# Patient Record
Sex: FEMALE | Race: WHITE | NOT HISPANIC OR LATINO | Employment: OTHER | URBAN - METROPOLITAN AREA
[De-identification: names, ages, dates, MRNs, and addresses within clinical notes are randomized per-mention and may not be internally consistent; named-entity substitution may affect disease eponyms.]

---

## 2017-03-06 ENCOUNTER — TELEPHONE (OUTPATIENT)
Dept: RHEUMATOLOGY | Facility: HOSPITAL | Age: 82
End: 2017-03-06

## 2017-03-06 DIAGNOSIS — M81.0 OSTEOPOROSIS, POSTMENOPAUSAL: Primary | ICD-10-CM

## 2017-03-21 DIAGNOSIS — M81.0 OSTEOPOROSIS, POSTMENOPAUSAL: Primary | ICD-10-CM

## 2017-03-21 RX ORDER — SODIUM CHLORIDE 0.9 % (FLUSH) 0.9 %
10 SYRINGE (ML) INJECTION
Status: CANCELLED | OUTPATIENT
Start: 2017-03-21

## 2017-03-21 RX ORDER — ZOLEDRONIC ACID 5 MG/100ML
5 INJECTION, SOLUTION INTRAVENOUS ONCE
Status: CANCELLED | OUTPATIENT
Start: 2017-03-21 | End: 2017-03-21

## 2017-05-29 ENCOUNTER — OFFICE VISIT (OUTPATIENT)
Dept: RHEUMATOLOGY | Facility: CLINIC | Age: 82
End: 2017-05-29
Payer: MEDICARE

## 2017-05-29 ENCOUNTER — LAB VISIT (OUTPATIENT)
Dept: LAB | Facility: HOSPITAL | Age: 82
End: 2017-05-29
Attending: INTERNAL MEDICINE
Payer: MEDICARE

## 2017-05-29 VITALS
DIASTOLIC BLOOD PRESSURE: 64 MMHG | HEART RATE: 62 BPM | BODY MASS INDEX: 24.7 KG/M2 | SYSTOLIC BLOOD PRESSURE: 175 MMHG | WEIGHT: 134.25 LBS | HEIGHT: 62 IN

## 2017-05-29 DIAGNOSIS — I10 ESSENTIAL HYPERTENSION: Chronic | ICD-10-CM

## 2017-05-29 DIAGNOSIS — Z51.81 MEDICATION MONITORING ENCOUNTER: Chronic | ICD-10-CM

## 2017-05-29 DIAGNOSIS — M81.0 OSTEOPOROSIS, POSTMENOPAUSAL: ICD-10-CM

## 2017-05-29 DIAGNOSIS — M81.0 OSTEOPOROSIS, POSTMENOPAUSAL: Primary | ICD-10-CM

## 2017-05-29 LAB
ANION GAP SERPL CALC-SCNC: 9 MMOL/L
BUN SERPL-MCNC: 20 MG/DL
CALCIUM SERPL-MCNC: 9.1 MG/DL
CHLORIDE SERPL-SCNC: 104 MMOL/L
CO2 SERPL-SCNC: 23 MMOL/L
CREAT SERPL-MCNC: 0.9 MG/DL
EST. GFR  (AFRICAN AMERICAN): >60 ML/MIN/1.73 M^2
EST. GFR  (NON AFRICAN AMERICAN): 59 ML/MIN/1.73 M^2
GLUCOSE SERPL-MCNC: 106 MG/DL
POTASSIUM SERPL-SCNC: 4.2 MMOL/L
SODIUM SERPL-SCNC: 136 MMOL/L

## 2017-05-29 PROCEDURE — 99213 OFFICE O/P EST LOW 20 MIN: CPT | Mod: S$PBB,,, | Performed by: INTERNAL MEDICINE

## 2017-05-29 PROCEDURE — 99999 PR PBB SHADOW E&M-EST. PATIENT-LVL III: CPT | Mod: PBBFAC,,, | Performed by: INTERNAL MEDICINE

## 2017-05-29 PROCEDURE — 99213 OFFICE O/P EST LOW 20 MIN: CPT | Mod: PBBFAC,25,PO | Performed by: INTERNAL MEDICINE

## 2017-05-29 RX ORDER — VALSARTAN 80 MG/1
TABLET ORAL
COMMUNITY
Start: 2017-04-24

## 2017-05-29 RX ORDER — ERGOCALCIFEROL 1.25 MG/1
CAPSULE ORAL
Qty: 3 CAPSULE | Refills: 4 | Status: SHIPPED | OUTPATIENT
Start: 2017-05-29 | End: 2018-07-22 | Stop reason: SDUPTHER

## 2017-05-29 NOTE — PROGRESS NOTES
RHEUMATOLOGY FOLLOWUP    CHIEF COMPLAINT:  Osteoporosis.    PERTINENT HISTORY:  Orlin is seen yearly for followup of osteoporosis.  She is on   Reclast and had dose in 2015 and 2016.  She has been on IV Boniva prior to that   and had been on holiday.  She has had no falls or Fx.    REVIEW OF SYSTEMS:  GENERAL:  Her weight is stable.  No falls.  No fevers, chills, fatigability,   change in appetite.  No recent infections.  SKIN:  No new rashes, itching or changes in color or texture of skin, no   Raynaud's, no malar rash.  HEAD:  No headache or recent head trauma.  EYES:  Visual acuity fine.  No ocular pain or redness.  EARS:  Denies ear pain, discharge or vertigo.  NOSE:  No loss of smell, no epistaxis or post nasal drip.  MOUTH AND THROAT:  No hoarseness or change in voice or oral ulcers.  No   difficulty swallowing or dryness.  NODES:  Denies swollen glands.  CHEST:  Denies shortness of breath, HASSAN, cyanosis, wheezing, cough and sputum   production.  CARDIOVASCULAR:  Hypertension is present and she is on Diovan and note a little   higher today, though jumps up and down, she says.  Denies chest pain, PND,   orthopnea or reduced exercise tolerance.  ABDOMEN:  No GI complaints.  No weight loss.  Denies nausea, vomiting, diarrhea,   abdominal pain, hematemesis or blood in stool.  URINARY:  No flank pain, dysuria or hematuria.  PERIPHERAL VASCULAR:  No claudication or cyanosis.  NEUROLOGIC:  No neurologic complaints.  No numbness, weakness or tingling.  METABOLIC:  Hyperlipidemia is appreciated.  I do not see that she is on any   other meds for that presently.  She sees Dr. Madrigal.    PHYSICAL EXAMINATION:  VITAL SIGNS:  With her height 5 feet 2 inches, weight 62 kg, blood pressure   175/64, pulse in the 60s.  Pain score 0.  APPEARANCE:  Well nourished, well developed, in no acute distress.  SKIN:  No rashes, no wounds, no ecchymosis.  Nail beds pink.  No digital ulcers.    No nodules or tightness noted.  HEENT:   Normocephalic, atraumatic, alert and oriented x3.  PERRLA.  Conjunctivae   clear, sclerae nonicteric.  No tonsillar enlargement.  No pharyngeal erythema   or exudate.  No ulcers or lesions noted.  Mucous membranes moist and pink.  Oral   pharynx clear.  Neck supple, no JVD.  Normal ROM.  Thyroid normal.  No masses   or tracheal deviation.  No cervical, axillary or inguinal lymph node   enlargement.  CHEST:  Lungs clear to auscultation bilaterally.  No dullness to percussion.  No   accessory muscle use.  No intercostal retraction noted.  CARDIOVASCULAR:  Normal S1, S2.  No rubs, murmurs or gallops.  No peripheral   edema.  ABDOMEN:  Bowel sounds normal, abdomen soft, not distended.  No tenderness or   masses.  No hepatosplenomegaly.  EXTREMITIES:  No clubbing, cyanosis or edema noted.  Dorsalis and pedis pulses   2+ palpable.  NEUROLOGICAL:  Cranial nerves II-XII intact.  Motor 5/5 strength major   flexors/extensors.  No tremor.  GAIT AND POSTURE:  Normal gait.  No cerebellar signs.  MUSCULOSKELETAL:  With very mild kyphosis.  No tenderness.  Has normal range of   motion in her small joints.  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  Hips and   knees move well.  They are not giving her trouble today.      LABORATORY DATA:  Shows calcium 9.1, creatinine clearance 59, creatinine 0.9.    Note she has been told in the past that she might have chronic kidney disease,   suspect this is an abnormal level because of dehydration of some medication she   was using.  Vitamin D level last was 39, holding steady over the last four or   five years.  Her DEXA reviewed today.  Bone density is improved at -2 at the   spine, about 6% and -1.8 at the total hip and that is holding steady over the   last several years.    IMPRESSION:  1.  Osteopenia with improvement since 2015.  2.  Balance is good.  3.  Hypertension - systolic.  4.  Chronic kidney disease by history though creatinine here looks stable over   the last three  years.    PLAN:  I am going to give her a Reclast holiday since things are stable to   better.  Maintain exercise and a fall prevention sheet was given today.    Maintain vitamin D 50,000 units once a month.  I gave her a prescription today.    Work with Dr. Madrigal on her hypertension.            /foster [id number] review        SL/PN  dd: 05/29/2017 14:05:14 (CDT)  td: 05/30/2017 08:27:54 (CDT)  Doc ID   #[id number]  Job ID #[id number]    CC:     Dr ty fox at [facility]

## 2017-05-29 NOTE — PATIENT INSTRUCTIONS
Stopping IV reclast for a while- much improved    Cont D once month    Preventing Falls  Each year about one-third of all persons over age 65 will fall. Many of these falls result in broken bones. Some common causes of falls include outdoor and indoor hazards. Certain lifestyle behaviors can also increase your chances of falling.  Outdoor Safety Tips  Try the following tips to help prevent falls when you are outside:  Wear low-heeled shoes with rubber soles for more solid footing (traction), and wear warm boots in winter.   Use hand rails as you go up and down steps and on escalators.   If sidewalks look slippery, walk in the grass for more solid footing.   In winter, carry a small bag of rock salt or kosher salt in your pocket or car. You can then sprinkle the salt or tyler litter on sidewalks or streets that are slippery.   Look carefully at floor surfaces in public buildings. Floors made of highly polished marble or tile can be very slippery. When these surfaces are wet, they may become dangerous. When floors have plastic or carpet runners in place, stay on them whenever possible.   Keep your porch, deck, walkways and driveway free of leaves, snow, trash or clutter. Also keep them in good repair. Cover porch steps with a gritty, weather-proof paint and install handrails on both sides.   Turn on the light outside your front door before leaving your home in the early evening so that you have outdoor light when you return after dark.   Use a shoulder bag, dar pack or a backpack purse to leave your hands free.   Use a walker or cane as needed.   Find out about community services that can provide help, such as 24-hour pharmacies and grocery stores that take orders by phone or internet and deliver, especially in poor weather.   Stop at curbs and check the height before stepping up or down. Be careful at curbs that have been cut away to allow access for bikes or wheelchairs. The incline may lead to a fall.   Consider  wearing hip protectors or hip pads for added protection should you fall.   Indoor Safety Tips: Fall-Proofing Your Home  Try the following tips to help prevent falls when you are inside your home:  Around the House  Place items you use most often within easy reach. This keeps you from having to do a lot of bending and stooping.   Use assistive devices to help avoid strain or injury. For example, use a long-handled grasping device to  items without bending or reaching. Use a pushcart to move heavy or hot items from the stove or countertop to the table.   If you must use a stepstool, use a sturdy one with a handrail and wide steps.   If you live alone, consider wearing a personal emergency response system (PERS). Also consider having a cordless telephone or cell phone to take from room to room so you can call for help if you fall.   Floors  Remove all loose wires, cords and throw rugs.   Keep floors free of clutter.   Be sure all carpets and area rugs have skid-proof backing or are tacked to the floor.   Do not use slippery wax on bare floors.   Keep furniture in its usual place.   Bathrooms  Install grab bars on the bathroom walls beside the tub, shower and toilet.   Use a non-skid rubber mat in the shower or tub.   If you are unsteady on your feet, you may want to use a plastic chair with a back and non-skid legs in the shower or tub and use a handheld showerhead to bathe.   Kitchen  Use non-skid mats or rugs on the floor near the stove and sink.   Clean up spills as soon as they happen (in the kitchen and anywhere in the home).   Bedroom  Place light switches within reach of your bed and a night light between the bedroom and bathroom.   Get up slowly from sitting or lying down since this may cause dizziness.   Keep a flashlight with fresh batteries beside your bed.   Stairs  Keep stairwells well lit, with light switches at the top and the bottom.   Install sturdy handrails on both sides.   Anurag the top and  bottom steps with bright tape.   Make sure carpeting is secure.   In addition to indoor and outdoor hazards, certain lifestyle behaviors can make a person more likely to fall. Here are some lifestyle tips to help you:   Be careful about drinking alcohol. Alcohol slows reflexes and may cause confusion, dizziness or disorientation. Too much alcohol can also cause bone loss.   If you are in a hurry, slow down. Accidents are more likely to happen when you rush.   Stay alert and focused when in public places.   Remember to wear appropriate shoes both indoors and out.   Exercise and eat healthy at every age. A healthy diet includes having a well-balanced diet that contains the recommended amounts of calcium and vitamin D.   If you have osteoporosis, you can take steps inside and outside your home and in your daily routine to prevent falls. Taking these steps can help you enjoy an active and healthy life.

## 2017-08-17 ENCOUNTER — PATIENT OUTREACH (OUTPATIENT)
Dept: ADMINISTRATIVE | Facility: HOSPITAL | Age: 82
End: 2017-08-17

## 2018-07-22 DIAGNOSIS — M81.0 OSTEOPOROSIS, POSTMENOPAUSAL: ICD-10-CM

## 2018-07-23 RX ORDER — ERGOCALCIFEROL 1.25 MG/1
CAPSULE ORAL
Qty: 3 CAPSULE | Refills: 3 | Status: SHIPPED | OUTPATIENT
Start: 2018-07-23 | End: 2019-07-26 | Stop reason: SDUPTHER

## 2018-10-26 PROBLEM — I70.0 AORTIC ATHEROSCLEROSIS: Status: ACTIVE | Noted: 2018-10-26

## 2018-10-26 PROBLEM — L71.9 ROSACEA: Status: ACTIVE | Noted: 2018-10-26

## 2019-07-26 DIAGNOSIS — M81.0 OSTEOPOROSIS, POSTMENOPAUSAL: ICD-10-CM

## 2019-07-28 RX ORDER — ERGOCALCIFEROL 1.25 MG/1
CAPSULE ORAL
Qty: 3 CAPSULE | Refills: 0 | Status: SHIPPED | OUTPATIENT
Start: 2019-07-28